# Patient Record
Sex: FEMALE | Race: WHITE | NOT HISPANIC OR LATINO | Employment: STUDENT | URBAN - NONMETROPOLITAN AREA
[De-identification: names, ages, dates, MRNs, and addresses within clinical notes are randomized per-mention and may not be internally consistent; named-entity substitution may affect disease eponyms.]

---

## 2019-02-05 ENCOUNTER — OFFICE VISIT (OUTPATIENT)
Dept: FAMILY MEDICINE CLINIC | Facility: CLINIC | Age: 19
End: 2019-02-05

## 2019-02-05 VITALS
DIASTOLIC BLOOD PRESSURE: 76 MMHG | HEART RATE: 120 BPM | TEMPERATURE: 98.8 F | HEIGHT: 64 IN | SYSTOLIC BLOOD PRESSURE: 124 MMHG | BODY MASS INDEX: 20.14 KG/M2 | OXYGEN SATURATION: 98 % | WEIGHT: 118 LBS

## 2019-02-05 DIAGNOSIS — R68.83 CHILLS: ICD-10-CM

## 2019-02-05 DIAGNOSIS — R52 BODY ACHES: Primary | ICD-10-CM

## 2019-02-05 DIAGNOSIS — J10.1 URI DUE TO INFLUENZA A VIRUS: ICD-10-CM

## 2019-02-05 LAB
FLUAV AG NPH QL: POSITIVE
FLUBV AG NPH QL IA: NEGATIVE

## 2019-02-05 PROCEDURE — 87804 INFLUENZA ASSAY W/OPTIC: CPT | Performed by: PHYSICIAN ASSISTANT

## 2019-02-05 PROCEDURE — 99213 OFFICE O/P EST LOW 20 MIN: CPT | Performed by: PHYSICIAN ASSISTANT

## 2019-02-05 RX ORDER — OSELTAMIVIR PHOSPHATE 75 MG/1
75 CAPSULE ORAL 2 TIMES DAILY
Qty: 10 CAPSULE | Refills: 0 | Status: SHIPPED | OUTPATIENT
Start: 2019-02-05 | End: 2019-02-10

## 2019-02-05 NOTE — PROGRESS NOTES
Subjective   Luci Wu is a 18 y.o. female.   Pt is new to me.  Chills   This is a new problem. The current episode started in the past 7 days. The problem occurs constantly. Associated symptoms include anorexia, chills, congestion, coughing, fatigue, a fever, myalgias, nausea, a sore throat, vomiting and weakness. Pertinent negatives include no abdominal pain, arthralgias, change in bowel habit, chest pain, diaphoresis, headaches, joint swelling, neck pain, numbness, rash, swollen glands, urinary symptoms, vertigo or visual change. The symptoms are aggravated by eating, drinking and coughing. She has tried acetaminophen, rest and sleep for the symptoms. The treatment provided no relief.   URI    This is a new problem. The current episode started in the past 7 days. The problem has been unchanged. The maximum temperature recorded prior to her arrival was 100.4 - 100.9 F. The fever has been present for less than 1 day. Associated symptoms include congestion, coughing, nausea, rhinorrhea, sinus pain (frontal), a sore throat and vomiting. Pertinent negatives include no abdominal pain, chest pain, diarrhea, dysuria, ear pain, headaches, joint pain, joint swelling, neck pain, plugged ear sensation, rash, sneezing, swollen glands or wheezing. She has tried acetaminophen and increased fluids for the symptoms. The treatment provided mild relief.      Pt is here today for a sick visit. Pt states she began feeling unwell x 2 days ago. Pt admits to visiting [facility] yesterday due to nausea, vomiting, & RLQ pain. Pt was sent from [facility] to [facility] ER on the same day for further evaluation and assessment where she was diagnosed with viral syndrome.    Diagnostic labs performed:    CBC - wbc elevated to 11.7otherwise grossly WNL.    CMP - grossly wnl.    Amylase - WNL    Lipase - WNL    Urine hcg - negative    CT abd/pelvis with contrast - within normal limits, no appendicitis.     Urinalysis - within normal  limits.    Pt admits to generalized body aches, nasal congestion, rhinorrhea, chills, fever (Tmax 100.0F yesterday). Pt states she has been unable to get zofran that was prescribed to her yesterday as the pharmacy was closed at her discharge from hospital. Pt states she is still experiencing n/v. Pt admits to dry nonproductive cough. Pt states she feels worse than when she began feeling unwell. Pt denies abdominal pain currently. Pt states she has been unable to eat x 2 days. Pt states she       Pt states she has been taking otc tylenol prn q 6 hours for fever.   The following portions of the patient's history were reviewed and updated as appropriate: allergies, current medications, past family history, past medical history, past social history, past surgical history and problem list.    Review of Systems   Constitutional: Positive for appetite change (decreased), chills, fatigue and fever. Negative for activity change, diaphoresis, unexpected weight gain and unexpected weight loss.   HENT: Positive for congestion, postnasal drip, rhinorrhea and sore throat. Negative for dental problem, drooling, ear discharge, ear pain, sinus pressure and sneezing.    Eyes: Negative for blurred vision, double vision and visual disturbance.   Respiratory: Positive for cough. Negative for apnea, choking, chest tightness, shortness of breath, wheezing and stridor.    Cardiovascular: Negative for chest pain, palpitations and leg swelling.   Gastrointestinal: Positive for anorexia, nausea and vomiting. Negative for abdominal distention, abdominal pain, change in bowel habit, constipation, diarrhea, GERD and indigestion.   Genitourinary: Negative for dysuria.   Musculoskeletal: Positive for myalgias. Negative for arthralgias, back pain, gait problem, joint pain, joint swelling, neck pain, neck stiffness and bursitis.   Skin: Negative.  Negative for rash.   Neurological: Positive for weakness. Negative for dizziness, vertigo,  light-headedness, numbness, headache and confusion.   Psychiatric/Behavioral: Negative.        Objective   Physical Exam   Constitutional: She is oriented to person, place, and time. She appears well-developed and well-nourished.  Non-toxic appearance. She has a sickly appearance. She appears ill. No distress. She is not overweight.She is not obese.She is not morbidly obese.  HENT:   Head: Normocephalic and atraumatic.   Right Ear: Hearing, external ear and ear canal normal. No drainage, swelling or tenderness. Tympanic membrane is not erythematous, not retracted and not bulging. A middle ear effusion (serous) is present. No decreased hearing is noted. cerumen impaction is not present.  Left Ear: Hearing, external ear and ear canal normal. No drainage, swelling or tenderness. Tympanic membrane is not erythematous, not retracted and not bulging. A middle ear effusion (serous) is present. No decreased hearing is noted. An impacted cerumen is not present.  Nose: Mucosal edema, rhinorrhea, sinus tenderness and congestion present. Foreign body is present. Right sinus exhibits frontal sinus tenderness. Right sinus exhibits no maxillary sinus tenderness. Left sinus exhibits frontal sinus tenderness. Left sinus exhibits no maxillary sinus tenderness.   Mouth/Throat: Uvula is midline and mucous membranes are normal. Mucous membranes are not pale, not dry and not cyanotic. Posterior oropharyngeal erythema present. No oropharyngeal exudate, posterior oropharyngeal edema or tonsillar abscesses. Tonsils are 1+ on the right. Tonsils are 1+ on the left. No tonsillar exudate.   Eyes: Conjunctivae and EOM are normal. Pupils are equal, round, and reactive to light.   Neck: Normal range of motion. Neck supple. No JVD present. No tracheal deviation present. No thyromegaly present.   Cardiovascular: Normal rate, regular rhythm and normal heart sounds. Exam reveals no gallop and no friction rub.   No murmur heard.  Pulmonary/Chest:  Effort normal and breath sounds normal. No stridor. No respiratory distress. She has no wheezes. She has no rales. She exhibits no tenderness.   Abdominal: Soft. Bowel sounds are normal. She exhibits no distension and no mass. There is no tenderness. There is no rigidity, no rebound, no guarding, no CVA tenderness, no tenderness at McBurney's point and negative Moreno's sign. No hernia.   Negative CVA tenderness bilaterally. Negative moreno's sign. Negative mcburney's point tenderness. Negative psoas sign. Negative obturator sign.    Musculoskeletal: Normal range of motion. She exhibits no edema.   Lymphadenopathy:     She has cervical adenopathy (mild, bilateral, anterior).   Neurological: She is alert and oriented to person, place, and time.   Skin: Skin is warm and dry. Capillary refill takes less than 2 seconds. No rash noted. She is not diaphoretic. No erythema. No pallor.   Psychiatric: She has a normal mood and affect. Her behavior is normal. Judgment and thought content normal.   Nursing note and vitals reviewed.    Vitals:    02/05/19 1059   BP: 124/76   Pulse: 120   Temp: 98.8 °F (37.1 °C)   SpO2: 98%         Assessment/Plan   Luci was seen today for chills and generalized body aches.    Diagnoses and all orders for this visit:    Body aches  -     Influenza Antigen, Rapid - Swab, Nasopharynx    Chills  -     Influenza Antigen, Rapid - Swab, Nasopharynx    URI due to influenza A virus  -     oseltamivir (TAMIFLU) 75 MG capsule; Take 1 capsule by mouth 2 (Two) Times a Day for 5 days.      URI due to influenza - discussed results of rapid influenza swab in office - positive for Influenza A. Prescription for tamiflu, as above, sent to pharmacy. Advised pt to continue alternating taking tylenol/ibuprofen prn q 6 hours for fever/pain. Advised pt to increase fluid intake and maintain good hand hygiene. Advised pt to avoid work until >24 hours afebrile with no fever reducing medication. Written excuse for work  for tomorrow and Thursday given to pt. Advised pt to begin taking flonase 2 sprays each nostril daily and mucinex 1200mg bid for congestion, pt states she will get these otc.     Patient educated to follow up sooner than next scheduled appointment if symptoms worsen or do not improve. Patient stated understanding and has agreed with plan of care. After visit summary was printed and given to patient.       This document has been electronically signed by Suha Batista PA-C on February 5, 2019 11:48 AM,.